# Patient Record
Sex: FEMALE | Race: WHITE
[De-identification: names, ages, dates, MRNs, and addresses within clinical notes are randomized per-mention and may not be internally consistent; named-entity substitution may affect disease eponyms.]

---

## 2017-06-05 ENCOUNTER — HOSPITAL ENCOUNTER (EMERGENCY)
Dept: HOSPITAL 45 - C.EDB | Age: 32
Discharge: HOME | End: 2017-06-05
Payer: COMMERCIAL

## 2017-06-05 VITALS
BODY MASS INDEX: 22.23 KG/M2 | WEIGHT: 125.44 LBS | HEIGHT: 62.99 IN | BODY MASS INDEX: 22.23 KG/M2 | WEIGHT: 125.44 LBS | HEIGHT: 62.99 IN

## 2017-06-05 VITALS — DIASTOLIC BLOOD PRESSURE: 78 MMHG | SYSTOLIC BLOOD PRESSURE: 117 MMHG | HEART RATE: 84 BPM | OXYGEN SATURATION: 100 %

## 2017-06-05 VITALS — TEMPERATURE: 98.6 F

## 2017-06-05 DIAGNOSIS — Z91.041: ICD-10-CM

## 2017-06-05 DIAGNOSIS — Z87.828: ICD-10-CM

## 2017-06-05 DIAGNOSIS — Z98.890: ICD-10-CM

## 2017-06-05 DIAGNOSIS — Z87.440: ICD-10-CM

## 2017-06-05 DIAGNOSIS — N93.9: Primary | ICD-10-CM

## 2017-06-05 DIAGNOSIS — Z79.899: ICD-10-CM

## 2017-06-05 DIAGNOSIS — F41.9: ICD-10-CM

## 2017-06-05 LAB
ALBUMIN/GLOB SERPL: 1.2 {RATIO} (ref 0.9–2)
ALP SERPL-CCNC: 47 U/L (ref 45–117)
ALT SERPL-CCNC: 17 U/L (ref 12–78)
ANION GAP SERPL CALC-SCNC: 6 MMOL/L (ref 3–11)
AST SERPL-CCNC: 12 U/L (ref 15–37)
BASOPHILS # BLD: 0.02 K/UL (ref 0–0.2)
BASOPHILS NFR BLD: 0.2 %
BUN SERPL-MCNC: 12 MG/DL (ref 7–18)
BUN/CREAT SERPL: 17.2 (ref 10–20)
CALCIUM SERPL-MCNC: 8.7 MG/DL (ref 8.5–10.1)
CHLORIDE SERPL-SCNC: 108 MMOL/L (ref 98–107)
CO2 SERPL-SCNC: 28 MMOL/L (ref 21–32)
COMPLETE: YES
CREAT CL PREDICTED SERPL C-G-VRATE: 95.8 ML/MIN
CREAT SERPL-MCNC: 0.71 MG/DL (ref 0.6–1.2)
EOSINOPHIL NFR BLD AUTO: 193 K/UL (ref 130–400)
GLOBULIN SER-MCNC: 3.1 GM/DL (ref 2.5–4)
GLUCOSE SERPL-MCNC: 83 MG/DL (ref 70–99)
HCT VFR BLD CALC: 38.7 % (ref 37–47)
IG%: 0.2 %
IMM GRANULOCYTES NFR BLD AUTO: 14.9 %
INR PPP: 1 (ref 0.9–1.1)
LYMPHOCYTES # BLD: 1.81 K/UL (ref 1.2–3.4)
MCH RBC QN AUTO: 31.3 PG (ref 25–34)
MCHC RBC AUTO-ENTMCNC: 34.1 G/DL (ref 32–36)
MCV RBC AUTO: 91.7 FL (ref 80–100)
MONOCYTES NFR BLD: 5.8 %
NEUTROPHILS # BLD AUTO: 0.5 %
NEUTROPHILS NFR BLD AUTO: 78.4 %
PARTIAL THROMBOPLASTIN RATIO: 1.2
PMV BLD AUTO: 11.2 FL (ref 7.4–10.4)
POTASSIUM SERPL-SCNC: 4 MMOL/L (ref 3.5–5.1)
PROTHROMBIN TIME: 10.6 SECONDS (ref 9–12)
RBC # BLD AUTO: 4.22 M/UL (ref 4.2–5.4)
SODIUM SERPL-SCNC: 142 MMOL/L (ref 136–145)
TSH SERPL-ACNC: 1.25 UIU/ML (ref 0.3–4.5)
WBC # BLD AUTO: 12.17 K/UL (ref 4.8–10.8)

## 2017-06-05 NOTE — DIAGNOSTIC IMAGING REPORT
EXAMINATION: PELVIC ULTRASOUND



CLINICAL HISTORY: EVAL PELVIC PAIN/VAGINAL BLEEDING X 4 WEEKS    



COMPARISON STUDY:  None



FINDINGS: 

The uterus measured 9.3 cm.

The endometrial stripe measured thickened at 1.5 cm..

The right ovary measured 2.9 cm with normal vascular flow.

The left ovary measured 3.7 cm with normal vascular flow.

There is no ultrasonographic evidence of ovarian torsion. It should be noted

that ovarian torsion can be present with normal Doppler ultrasonographic

findings.

Adjacent to the lower aspect of the cervix is an adjacent heterogeneous density

measuring approximately 4 x 5 x 3 cm. This potentially relates to a fecal filled

rectum and rectosigmoid.



IMPRESSION:  

1. Thickened endometrium at 1.5 cm.

2. This may be secondary to the cecum triphasic the menstrual cycle versus

endometrial hyperplasia.

3. 4 x 5 x 3 cm soft tissue density adjacent to the cervix. Statistically this

is most consistent with that of a fecal filled rectosigmoid, although if the

patient remains symptomatic either a CT of the pelvis with contrast enhancement,

including intravenous and oral contrast, or MRI of the pelvis would be

indicated.







Electronically signed by:  Christopher Garza M.D.

6/5/2017 8:13 PM



Dictated Date/Time:  6/5/2017 8:08 PM

## 2017-06-05 NOTE — EMERGENCY ROOM VISIT NOTE
ED Visit Note


First contact with patient:  17:32


CHIEF COMPLAINT:  Vaginal bleeding 1 month, heavier with increased pain and 

cramping today





HISTORY OF PRESENT ILLNESS:  Patient is a  Ab2 32-year-old female patient 

who presents to emergency department for evaluation of vaginal bleeding.  

Patient reports that she had a positive home pregnancy test in early March.  

She had an elective  on .  She was 8 weeks pregnant.  Patient 

reports procedure went well and she had no complications.  Patient had expected 

postprocedural bleeding for about 2 weeks, then the bleeding stopped for about 

2 weeks.  Patient developed spotting on , which was light, and only required 

that she wear a pantiliner.  In the last 24 hours however the bleeding has 

markedly increased.  She noted some lower pelvic cramping over the weekend, but 

the bleeding picked up today.  She states that she has been saturating a pad 

every 10-15 minutes for the last 5 hours.  She is also passing golf ball size 

clots.  She rates her discomfort a 6/10 presently.  She denies any urinary 

symptoms.  No lightheadedness or dizziness.  She had a LEEP procedure performed 

in November, however never had any gynecologic follow-up.  She reports that 

prior to the elective , her periods are regular.  She denies any other 

easy bruising or bleeding.





REVIEW OF SYSTEMS: Review of systems as per HPI.  All other systems reviewed 

were negative.  10 systems reviewed.





PMH:  Electronic medical records are reviewed and summarized as above/below.  

See Problem List.





SOCIAL HISTORY:  Patient lives at home with her daughter.  She does not smoke.  

Denies alcohol socially.


   


PHYSICAL EXAM: Vital Signs: Reviewed Nurse's notes. 


CONSTITUTIONAL: Patient is a well-appearing 32-year-old white female who is 

awake and alert and in no acute distress.  Vital signs are stable.  She is 

slightly tachycardic, normotensive.


EYES:  Pupils equal, round, reactive to light and accommodation.  EOMs intact 

without nystagmus.  Sclera are anicteric. 


ENT:  Tympanic membranes intact, with normal landmarks.  External canals are 

clear.  Oral and nasopharynx are clear.  Mucous membranes are moist, no lesions

, tongue and gums appear normal.     


NECK: Supple without lymphadenopathy.  No thyromegaly.  No meningeal signs.  

Full active range of motion without discomfort.


CARDIOVASCULAR: Regular rate and rhythm, with normal S1 and S2, no murmur or 

gallop or rub is heard.  No carotid bruits auscultated.  No JVD.  Peripheral 

pulses easily palpable.


RESPIRATORY: Breath sounds equal and clear to auscultation without wheezes, 

rales, or rhonchi heard.   Full and equal chest expansion without accessory 

muscle use or retractions. 


ABDOMEN: Bowel sounds are present.  Abdomen is soft, scaphoid, nondistended and 

slightly tender in the suprapubic region.  No guarding, rebound or rigidity.


INTEGUMENTARY: No lesions or rash, normal skin turgor. 


LYMPH: No lymphadenopathy.


PELVIC EXAM:  VULVA: No ulcers, vesicles or atrophy.  VAGINA: Bright red blood 

and large clots noted in the vaginal vault.  CERVIX: Slightly dilated, 

nontender.  No active bleeding through the cervix.  UTERUS: Normal size, 

nontender.  ADNEXAE: No masses or tenderness.  A nurse was present as a 

chaperone during the examination.  





EMERGENCY DEPARTMENT COURSE: The patient was seen and assessed as above.  Her 

old records were reviewed.  IV lock was initiated and the patient was hydrated 

with normal saline solution.  She was medicated with Toradol 30 mg Zofran 4 mg 

and morphine 4 mg IV.  CBC with differential, coags, CMP, TSH, quantitative hCG 

and type and screen were drawn.  Pelvic ultrasound was performed.





Laboratory studies noted a white count of 12,100, just slightly elevated.  H&H 

is normal at 13.2 and 38.7, platelet count 193,000, PT/INR within normal 

limits.  Chemistries are without significant abnormality.  Liver functions, 

electrolytes and renal functions are normal.  TSH is indicative of a euthyroid 

state.  HCG is 190.  Urinalysis was not obtained due to the heavy vaginal 

bleeding and likely contamination.





Pelvic ultrasound noted thickened endometrial lining at 5 cm.





The patient remained hemodynamically stable while in the emergency department.  

Tachycardia improved with IV hydration.  Patient reported that her pain 

improved with the IV medications as well.  Laboratory and diagnostic imaging 

studies were reviewed with Dr. Ruth who recommended  Methergine 0.2 mg IM and 

close follow-up in the office.  Methergine was administered.  Patient was made 

aware of the results of her laboratory and diagnostic imaging studies.  She 

rated her discomfort a 3/10.  Conservative care and bleeding precautions were 

discussed with the patient.





Differential diagnoses entertained included abnormal vaginal bleeding, retained 

products of conception, endometritis, pregnancy, ectopic pregnancy, spontaneous 

, coagulopathy, cervical laceration, uterine perforation, infectious 

process, among others.





Patient was reviewed in the [place] [employer] Prescription Drug 

Monitoring Program, and there were no red flags noted.





EXAMINATION: PELVIC ULTRASOUND





CLINICAL HISTORY: EVAL PELVIC PAIN/VAGINAL BLEEDING X 4 WEEKS    





COMPARISON STUDY:  None





FINDINGS: 


The uterus measured 9.3 cm.


The endometrial stripe measured thickened at 1.5 cm..


The right ovary measured 2.9 cm with normal vascular flow.


The left ovary measured 3.7 cm with normal vascular flow.


There is no ultrasonographic evidence of ovarian torsion. It should be noted


that ovarian torsion can be present with normal Doppler ultrasonographic


findings.


Adjacent to the lower aspect of the cervix is an adjacent heterogeneous density


measuring approximately 4 x 5 x 3 cm. This potentially relates to a fecal filled


rectum and rectosigmoid.





IMPRESSION:  


1. Thickened endometrium at 1.5 cm.


2. This may be secondary to the cecum triphasic the menstrual cycle versus


endometrial hyperplasia.


3. 4 x 5 x 3 cm soft tissue density adjacent to the cervix. Statistically this


is most consistent with that of a fecal filled rectosigmoid, although if the


patient remains symptomatic either a CT of the pelvis with contrast enhancement,


including intravenous and oral contrast, or MRI of the pelvis would be


indicated.


Problem List


Medical Problems:


(1) Anxiety


Status: Chronic  





(2) Lumbago


Status: Resolved  





(3) Migraines


Status: Chronic  





(4) Normal labor


Status: Resolved  





(5) Personal History Of Urinary Calculi


Status: Chronic  





(6) r/o SROM


Status: Resolved  





(7) ro SROM


Status: Resolved  





Surgical Problems:


(1) Elective 


Status: Resolved  





(2) H/O lithotripsy


Status: Resolved  





(3) H/O reconstruction of anterior cruciate ligament tear


Status: Resolved  





(4) H/O wisdom tooth extraction


Status: Resolved  











Current/Historical Medications


Scheduled


Bupropion Hcl (Wellbutrin Xl), 300 MG PO DAILY





Scheduled PRN


Alprazolam (Xanax), 0.5 MG PO HS PRN for Sleep


Hydrocodone/Acetaminophen 5MG/325MG (Norco 5MG/325MG), 1-2 TABLETS PO Q4 PRN 

for Pain





Allergies


Uncoded Allergies:  


     CONTRASTMEDIA (Allergy, Mild, UNKNOWN, 2/5/10)





Vital Signs











  Date Time  Temp Pulse Resp B/P (MAP) Pulse Ox O2 Delivery O2 Flow Rate FiO2


 


17 21:14  84 18 117/78 100   


 


17 20:07  75 18 115/76 100 Room Air  


 


17 18:29  93 18 119/74 99 Room Air  


 


17 16:39 37.0 102 20 141/81 100 Room Air  











Laboratory Results


17 18:08








Red Blood Count 4.22, Mean Corpuscular Volume 91.7, Mean Corpuscular Hemoglobin 

31.3, Mean Corpuscular Hemoglobin Concent 34.1, Mean Platelet Volume 11.2, 

Neutrophils (%) (Auto) 78.4, Lymphocytes (%) (Auto) 14.9, Monocytes (%) (Auto) 

5.8, Eosinophils (%) (Auto) 0.5, Basophils (%) (Auto) 0.2, Neutrophils # (Auto) 

9.55, Lymphocytes # (Auto) 1.81, Monocytes # (Auto) 0.70, Eosinophils # (Auto) 

0.06, Basophils # (Auto) 0.02





17 18:08

















Test


  17


18:08


 


White Blood Count


  12.17 K/uL


(4.8-10.8)


 


Red Blood Count


  4.22 M/uL


(4.2-5.4)


 


Hemoglobin


  13.2 g/dL


(12.0-16.0)


 


Hematocrit 38.7 % (37-47) 


 


Mean Corpuscular Volume


  91.7 fL


()


 


Mean Corpuscular Hemoglobin


  31.3 pg


(25-34)


 


Mean Corpuscular Hemoglobin


Concent 34.1 g/dl


(32-36)


 


Platelet Count


  193 K/uL


(130-400)


 


Mean Platelet Volume


  11.2 fL


(7.4-10.4)


 


Neutrophils (%) (Auto) 78.4 % 


 


Lymphocytes (%) (Auto) 14.9 % 


 


Monocytes (%) (Auto) 5.8 % 


 


Eosinophils (%) (Auto) 0.5 % 


 


Basophils (%) (Auto) 0.2 % 


 


Neutrophils # (Auto)


  9.55 K/uL


(1.4-6.5)


 


Lymphocytes # (Auto)


  1.81 K/uL


(1.2-3.4)


 


Monocytes # (Auto)


  0.70 K/uL


(0.11-0.59)


 


Eosinophils # (Auto)


  0.06 K/uL


(0-0.5)


 


Basophils # (Auto)


  0.02 K/uL


(0-0.2)


 


RDW Standard Deviation


  43.4 fL


(36.4-46.3)


 


RDW Coefficient of Variation


  12.9 %


(11.5-14.5)


 


Immature Granulocyte % (Auto) 0.2 % 


 


Immature Granulocyte # (Auto)


  0.03 K/uL


(0.00-0.02)


 


Prothrombin Time


  10.6 SECONDS


(9.0-12.0)


 


Prothromb Time International


Ratio 1.0 (0.9-1.1) 


 


 


Activated Partial


Thromboplast Time 30.3 SECONDS


(21.0-31.0)


 


Partial Thromboplastin Ratio 1.2 


 


Anion Gap


  6.0 mmol/L


(3-11)


 


Est Creatinine Clear Calc


Drug Dose 95.8 ml/min 


 


 


Estimated GFR (


American) 132.5 


 


 


Estimated GFR (Non-


American 114.3 


 


 


BUN/Creatinine Ratio 17.2 (10-20) 


 


Calcium Level


  8.7 mg/dl


(8.5-10.1)


 


Total Bilirubin


  0.5 mg/dl


(0.2-1)


 


Aspartate Amino Transf


(AST/SGOT) 12 U/L (15-37) 


 


 


Alanine Aminotransferase


(ALT/SGPT) 17 U/L (12-78) 


 


 


Alkaline Phosphatase


  47 U/L


()


 


Total Protein


  6.9 gm/dl


(6.4-8.2)


 


Albumin


  3.8 gm/dl


(3.4-5.0)


 


Globulin


  3.1 gm/dl


(2.5-4.0)


 


Albumin/Globulin Ratio 1.2 (0.9-2) 


 


Thyroid Stimulating Hormone


(TSH) 1.250 uIu/ml


(0.300-4.500)


 


Human Chorionic Gonadotropin,


Quant 190 mIU/mL 


 











Medications Administered











 Medications


  (Trade)  Dose


 Ordered  Sig/Joaquin


 Route  Start Time


 Stop Time Status Last Admin


Dose Admin


 


 Ketorolac


 Tromethamine


  (Toradol Inj)  30 mg  NOW  STAT


 IV  17 17:55


 17 17:58 DC 17 18:17


30 MG


 


 Ondansetron HCl


  (Zofran Inj)  4 mg  NOW  STAT


 IV  17 17:55


 17 17:58 DC 17 18:16


4 MG


 


 Morphine Sulfate


  (MoRPHine


 SULFATE INJ)  4 mg  NOW  STAT


 IV  17 17:55


 6/5/17 17:58 DC 17 18:18


4 MG


 


 Sodium Chloride  2,000 ml @ 


 999 mls/hr  Q2H1M STAT


 IV  17 18:09


 17 20:09 DC 17 18:19


999 MLS/HR


 


 Methylergonovine


 Maleate


  (Methergine Inj)  0.2 mg  NOW  STAT


 IM  17 20:29


 17 20:39 DC 17 20:57


0.2 MG


 


 Acetaminophen/


 Hydrocodone Bitart


  (Norco 5/325mg


 Home Pack)  1 homepack  UD  ONCE


 PO  17 21:00


 17 21:01 DC 17 21:09


1 HOMEPACK











Departure Information


Impression





 Primary Impression:  


 Abnormal vaginal bleeding





Prescriptions





Hydrocodone/Acetaminophen 5MG/325MG (Norco 5MG/325MG)  Tab


1-2 TABLETS PO Q4 Y for Pain, #15 TAB


   For Initial Treatment


   Prov: Edith Ventura PA         17





Referrals


Christopher Garrett M.D. (PCP)








PRINCESS Sarmiento M.D.





Patient Instructions


My [facility]





Additional Instructions





DO NOT drive, drink alcohol, operate machinery, or perform dangerous activities 

today.  You were given medications in the ER that can affect your ability to 

safely function or operate a vehicle.





Hydrocodone/Acetaminophen (Norco) 5/325 mg: Take 1-2 pills every four hours for 

breakthrough pain.  Avoid alcohol, operating machinery or dangerous equipment, 

working on ladders or roofs, DRIVING, or situations where being under the 

influence may be dangerous.  It is recommended to use an over-the-counter stool 

softener such as Colace, 100mg twice daily while taking this medication to 

avoid constipation. 





Ibuprofen(Motrin, Advil) may be used for fever or pain.  Use 600mg every six 

hours as needed.  Take with food.  Avoid using more than 2400mg in a 24 hour 

period.  Do not use 2400mg per day for more than three consecutive days without 

physician direction.  Prolonged inappropriate use can lead to stomach upset or 

ulcers. 


(AND/OR)


Acetaminophen(Tylenol) may be used for fever or pain.  Use 1000mg every six 

hours as needed.  Avoid using more than 3000mg in a 24 hour period.  





Rest and avoid any heavy lifting or strenuous activity.





Strict vaginal rest-no tampons, douching or intercourse.





Drink plenty of fluids.  Diet as tolerated.





Follow up with OB/GYN tomorrow as scheduled.





Return to the ED for worsening pain, heavier bleeding (completely saturating a 

pad in less than one hour, passing clots larger than your fist), lightheadedness

, dizziness, passing out, worsening of your condition or as needed.

## 2017-06-07 ENCOUNTER — HOSPITAL ENCOUNTER (OUTPATIENT)
Dept: HOSPITAL 45 - C.LAB1850 | Age: 32
Discharge: HOME | End: 2017-06-07
Attending: OBSTETRICS & GYNECOLOGY
Payer: COMMERCIAL

## 2017-06-07 DIAGNOSIS — N93.8: Primary | ICD-10-CM

## 2017-06-07 LAB
EOSINOPHIL NFR BLD AUTO: 173 K/UL (ref 130–400)
HCT VFR BLD CALC: 32.4 % (ref 37–47)
MCH RBC QN AUTO: 30.8 PG (ref 25–34)
MCHC RBC AUTO-ENTMCNC: 33 G/DL (ref 32–36)
MCV RBC AUTO: 93.4 FL (ref 80–100)
PMV BLD AUTO: 11.6 FL (ref 7.4–10.4)
RBC # BLD AUTO: 3.47 M/UL (ref 4.2–5.4)
WBC # BLD AUTO: 5.19 K/UL (ref 4.8–10.8)

## 2017-06-20 ENCOUNTER — HOSPITAL ENCOUNTER (OUTPATIENT)
Dept: HOSPITAL 45 - C.PAPS | Age: 32
Discharge: HOME | End: 2017-06-20
Attending: OBSTETRICS & GYNECOLOGY
Payer: COMMERCIAL

## 2017-06-20 DIAGNOSIS — N87.1: Primary | ICD-10-CM

## 2017-11-14 ENCOUNTER — HOSPITAL ENCOUNTER (OUTPATIENT)
Dept: HOSPITAL 45 - C.LAB | Age: 32
Discharge: HOME | End: 2017-11-14
Attending: FAMILY MEDICINE
Payer: COMMERCIAL

## 2017-11-14 DIAGNOSIS — D64.9: Primary | ICD-10-CM

## 2017-11-14 LAB
BASOPHILS # BLD: 0.03 K/UL (ref 0–0.2)
BASOPHILS NFR BLD: 0.5 %
COMPLETE: YES
EOSINOPHIL NFR BLD AUTO: 188 K/UL (ref 130–400)
HCT VFR BLD CALC: 39.1 % (ref 37–47)
IG%: 0 %
IMM GRANULOCYTES NFR BLD AUTO: 37.1 %
LYMPHOCYTES # BLD: 2.44 K/UL (ref 1.2–3.4)
MCH RBC QN AUTO: 29.6 PG (ref 25–34)
MCHC RBC AUTO-ENTMCNC: 33 G/DL (ref 32–36)
MCV RBC AUTO: 89.7 FL (ref 80–100)
MONOCYTES NFR BLD: 7.9 %
NEUTROPHILS # BLD AUTO: 1.1 %
NEUTROPHILS NFR BLD AUTO: 53.4 %
PMV BLD AUTO: 11.7 FL (ref 7.4–10.4)
RBC # BLD AUTO: 4.36 M/UL (ref 4.2–5.4)
WBC # BLD AUTO: 6.58 K/UL (ref 4.8–10.8)

## 2018-01-30 ENCOUNTER — HOSPITAL ENCOUNTER (OUTPATIENT)
Dept: HOSPITAL 45 - C.RADBC | Age: 33
Discharge: HOME | End: 2018-01-30
Attending: FAMILY MEDICINE
Payer: COMMERCIAL

## 2018-01-30 DIAGNOSIS — M54.5: Primary | ICD-10-CM

## 2018-01-30 NOTE — DIAGNOSTIC IMAGING REPORT
L-SPINE MIN 4 VIEWS ROUTINE



CLINICAL HISTORY: LOW BACK PAIN    



COMPARISON: Lumbar spine radiographs May 12, 2010 and MRI the lumbar spine April 7, 2016.



FINDINGS:  There are possible punctate left renal calculi. Bowel gas pattern is

normal. Alignment of the lumbar spine is anatomic. Vertebral body heights are

maintained. There is mild disc space narrowing at L5-S1. Several Schmorl's nodes

are noted. No fracture or suspicious lesion is present. The left aspect of L5 is

hemisacralized.



IMPRESSION: 



1. No lumbar spine fracture or subluxation.



2. No change in mild disc space narrowing at L5-S1. 



3. Possible punctate left renal calculi.







Electronically signed by:  Moo Avila M.D.

1/30/2018 4:21 PM



Dictated Date/Time:  1/30/2018 4:20 PM

## 2018-05-21 ENCOUNTER — HOSPITAL ENCOUNTER (OUTPATIENT)
Dept: HOSPITAL 45 - C.PAPS | Age: 33
Discharge: HOME | End: 2018-05-21
Attending: OBSTETRICS & GYNECOLOGY
Payer: COMMERCIAL

## 2018-05-21 DIAGNOSIS — Z12.4: Primary | ICD-10-CM

## 2018-05-21 DIAGNOSIS — R87.610: ICD-10-CM

## 2018-08-22 ENCOUNTER — HOSPITAL ENCOUNTER (OUTPATIENT)
Dept: HOSPITAL 45 - C.LAB | Age: 33
Discharge: HOME | End: 2018-08-22
Attending: PHYSICIAN ASSISTANT
Payer: COMMERCIAL

## 2018-08-22 DIAGNOSIS — Z00.00: Primary | ICD-10-CM

## 2018-08-22 LAB
KETONES UR QL STRIP: 71 MG/DL
PH UR: 126 MG/DL (ref 0–200)